# Patient Record
Sex: MALE | Race: WHITE | URBAN - METROPOLITAN AREA
[De-identification: names, ages, dates, MRNs, and addresses within clinical notes are randomized per-mention and may not be internally consistent; named-entity substitution may affect disease eponyms.]

---

## 2023-03-23 ENCOUNTER — OFFICE VISIT (OUTPATIENT)
Dept: URBAN - METROPOLITAN AREA CLINIC 22 | Facility: CLINIC | Age: 38
End: 2023-03-23
Payer: COMMERCIAL

## 2023-03-23 DIAGNOSIS — R51.9 HEADACHE: Primary | ICD-10-CM

## 2023-03-23 DIAGNOSIS — S05.8X2A: ICD-10-CM

## 2023-03-23 PROCEDURE — 92004 COMPRE OPH EXAM NEW PT 1/>: CPT | Performed by: STUDENT IN AN ORGANIZED HEALTH CARE EDUCATION/TRAINING PROGRAM

## 2023-03-23 PROCEDURE — 92133 CPTRZD OPH DX IMG PST SGM ON: CPT | Performed by: STUDENT IN AN ORGANIZED HEALTH CARE EDUCATION/TRAINING PROGRAM

## 2023-03-23 ASSESSMENT — VISUAL ACUITY: OD: 20/20

## 2023-03-23 ASSESSMENT — INTRAOCULAR PRESSURE
OS: 4
OD: 13

## 2023-03-23 NOTE — IMPRESSION/PLAN
Impression: Other injuries of left eye, initial encounter: S05.8x2A. Plan: -- pt reports trauma x 2017, diagnosed w/ RD but surgery was not recommended
-- has been on pred PRN and brimonidine BID OS x 2019 
-- pt denies pain other than when headaches occur
-- today: corneal edema/opacification, no posterior views -- low IOP OS
-- will d/c brimonidine; check IOP next visit

## 2023-03-23 NOTE — IMPRESSION/PLAN
Impression: Headache: R51.9. Plan: -- onset since head trauma x 2017
-- occur daily, no specific triggers, can last a minute up to hours -- relieved w/ ibuprofen, acetaminophen -- healthy optic nerve OD, no posterior view OS d/t trauma 
-- OCT RNFL ordered: WN OD
-- return for [facility] 30-2
-- follow-up with PCP for further evaluation/imaging as needed

## 2023-04-13 ENCOUNTER — OFFICE VISIT (OUTPATIENT)
Dept: URBAN - METROPOLITAN AREA CLINIC 22 | Facility: CLINIC | Age: 38
End: 2023-04-13
Payer: COMMERCIAL

## 2023-04-13 DIAGNOSIS — S05.8X2D OTHER INJURIES OF LEFT EYE AND ORBIT, SUBSEQUENT ENCOUNTER: ICD-10-CM

## 2023-04-13 DIAGNOSIS — R51.9 HEADACHE, UNSPECIFIED: Primary | ICD-10-CM

## 2023-04-13 PROCEDURE — 92083 EXTENDED VISUAL FIELD XM: CPT | Performed by: STUDENT IN AN ORGANIZED HEALTH CARE EDUCATION/TRAINING PROGRAM

## 2023-04-13 PROCEDURE — 92012 INTRM OPH EXAM EST PATIENT: CPT | Performed by: STUDENT IN AN ORGANIZED HEALTH CARE EDUCATION/TRAINING PROGRAM

## 2023-04-13 ASSESSMENT — INTRAOCULAR PRESSURE
OD: 17
OS: 10

## 2023-04-13 NOTE — IMPRESSION/PLAN
Impression: Other injuries of left eye and orbit, subsequent encounter: S05.8x2D. Plan: -- pt reports trauma x 2017, diagnosed w/ RD but surgery was not recommended
-- has been on pred PRN and brimonidine BID OS x 2019 
-- pt denies pain -- corneal edema/opacification, no posterior views
-- IOP WNL off brimonidine; will d/c Monitor yearly. Contact clinic if experiencing pain.

## 2023-04-13 NOTE — IMPRESSION/PLAN
Impression: Headache: R51.9. Plan: -- onset since head trauma x 2017
-- occur daily, no specific triggers, can last a minute up to hours -- relieved w/ ibuprofen, acetaminophen -- healthy optic nerve OD, no posterior view OS d/t trauma 
-- OCT RNFL: WNL OD, UTT OS
-- HVF today: essentially full OD, UTT OS Follow-up with PCP for further evaluation/imaging as needed